# Patient Record
(demographics unavailable — no encounter records)

---

## 2024-12-16 NOTE — ADDENDUM
[FreeTextEntry1] :  Documented by Bartolo Meyers acting as a scribe for YOLA DONALDSON on 12/16/2024.

## 2024-12-16 NOTE — HISTORY OF PRESENT ILLNESS
Statement Selected [FreeTextEntry1] : 43 year old male referred by [facility] for an SVT of the  right leg. Patient reports he developed pain and swelling associated with variocse veins of the right leg in October.  He was evaluated by [facility] ED for right leg swelling and pain. He underwent a RLE venous duplex  which demonstrated no DVT and an SVT of the right calf varicose veins. Patient reports that he has been wearing compression  stockings daily since October. He reports continued swelling of the right ankle. He reports that the SVT has resolved.  Denies prior hx of DVT or SVT. Patient was referred to vascular for further evaluation.

## 2024-12-16 NOTE — END OF VISIT
[FreeTextEntry3] :  All medical record entries made by the sunnyibe were at [facility]END[facility] marrero KENNETH, direction and personally dictated by me on 12/16/2024. I have reviewed the chart and agree that the record accurately reflects my personal performance of the history, physical exam, assessment, and plan. I have also personally directed, reviewed, and agreed with the chart.

## 2024-12-16 NOTE — ASSESSMENT
[FreeTextEntry1] : 43 year old male with right pain and swelling associated with varicose veins. He was diagnosed with an SVT of the right leg in October and has been wearing compression stockings on a daily basis since then.  I am suspicious he has venous insufficiency.  I recommended he undergo a  lower extremity venous duplex to assess for venous insufficiency,    He was instructed to wear compression stockings on a daily basis. He will follow up when the results of the venous duplex are available.  Further treatment will depend upon the results of the duplex.   He was instructed to apply warm compresses to the area and to take Motrin if he develops a recurrent SVT.   I recommended to continue to wear compression stockings.

## 2024-12-16 NOTE — PHYSICAL EXAM
[Normal Breath Sounds] : Normal breath sounds [Ankle Swelling (On Exam)] : present [Ankle Swelling On The Right] : of the right ankle [Varicose Veins Of Lower Extremities] : present [Varicose Veins Of The Right Leg] : of the right leg [Ankle Swelling On The Left] : moderate [Alert] : alert [Oriented to Person] : oriented to person [Oriented to Place] : oriented to place [Oriented to Time] : oriented to time [JVD] : no jugular venous distention  [2+] : left 2+ [] : not present [de-identified] : Awake and alert [de-identified] : moderate edema of RLE, varicose veins >3mm right thigh and calf [de-identified] :  No gross motor or sensory deficits. [de-identified] : Appropriate

## 2025-01-27 NOTE — REVIEW OF SYSTEMS
[Lower Ext Edema] : lower extremity edema [Joint Swelling] : joint swelling [Limb Swelling] : limb swelling [Fever] : no fever [Chills] : no chills [Leg Claudication] : no intermittent leg claudication [Limb Pain] : no limb pain [Limb Weakness] : no limb weakness

## 2025-01-27 NOTE — END OF VISIT
[FreeTextEntry3] :  All medical record entries made by the sunnyibe were at EMNDO[facility] marrero KENNETH, direction and personally dictated by me on 1/27/2025. I have reviewed the chart and agree that the record accurately reflects my personal performance of the history, physical exam, assessment, and plan. I have also personally directed, reviewed, and agreed with the chart.

## 2025-01-27 NOTE — DATA REVIEWED
[FreeTextEntry1] : Bilateral lower extremity venous duplex - No DVT or SVT, Right GSV  venous insufficiency with a markedly enlarged right GSV and SSV

## 2025-01-27 NOTE — PHYSICAL EXAM
[Normal Breath Sounds] : Normal breath sounds [2+] : left 2+ [Ankle Swelling (On Exam)] : present [Varicose Veins Of Lower Extremities] : present [Varicose Veins Of The Right Leg] : of the right leg [Ankle Swelling On The Left] : moderate [Alert] : alert [Oriented to Person] : oriented to person [Oriented to Place] : oriented to place [Oriented to Time] : oriented to time [Ankle Swelling On The Right] : mild [JVD] : no jugular venous distention  [] : not present [de-identified] : Awake and alert [de-identified] : mild edema of RLE, varicose veins >3mm right thigh and calf [de-identified] :  No gross motor or sensory deficits. [de-identified] : Appropriate

## 2025-01-27 NOTE — HISTORY OF PRESENT ILLNESS
[FreeTextEntry1] : 43 year old male referred by [facility] for an SVT of the  right leg. Patient reports he developed pain and swelling associated with variocse veins of the right leg in October.  He was evaluated by [facility] ED for right leg swelling and pain. He underwent a RLE venous duplex  which demonstrated no DVT and an SVT of the right calf varicose veins. Patient reports that he has been wearing compression  stockings daily since October. He reports continued swelling of the right ankle. He reports that the SVT has resolved.  Denies prior hx of DVT or SVT. Patient was referred to vascular for further evaluation.  [de-identified] : 43 year old male returns in follow up. He developed pain and swelling associated with varicose veins of the right leg in October.  He was evaluated by [facility] ED for right leg swelling and pain. He underwent a RLE venous duplex which demonstrated no DVT and right calf varicose veins. Patient reports that he has been wearing compression stockings daily since October. His swelling of the right ankle has resolved. Denies prior hx of DVT or SVT. He underwent a repeat lower extremity venous duplex and is here to discuss the results and further treatment options.

## 2025-01-27 NOTE — ADDENDUM
[FreeTextEntry1] :  Documented by Bartolo Meyers acting as a scribe for YOLA DONALDSON on 1/27/2025.

## 2025-01-27 NOTE — ASSESSMENT
[FreeTextEntry1] : 43 year old male with right lower extremity  pain and swelling associated with varicose veins. He has been wearing compression stockings since his last visit with improvement of his symptoms.. He underwent a repeat bilateral lower extremity venous duplex which demonstrated no DVT and venous insufficiency with a markedly enlarged right GSV and SSV. He has no significant venous insufficiency on the left.   Patient continues to have symptomatic varicose veins despite wearing compression stockings. He is an excellent candidate for an ablation of the right greater saphenous vein. Risks and benefits were discussed with patient in depth including infection, bleeding, and DVT. The patient would call the office if he wishes to proceed. Patient will continue to wear compression stockings.  45 minutes